# Patient Record
Sex: FEMALE | Race: WHITE | NOT HISPANIC OR LATINO | Employment: FULL TIME | URBAN - METROPOLITAN AREA
[De-identification: names, ages, dates, MRNs, and addresses within clinical notes are randomized per-mention and may not be internally consistent; named-entity substitution may affect disease eponyms.]

---

## 2017-01-09 DIAGNOSIS — E03.9 HYPOTHYROIDISM, ADULT: ICD-10-CM

## 2017-01-09 RX ORDER — LEVOTHYROXINE SODIUM 0.05 MG/1
50 TABLET ORAL DAILY
Qty: 30 TABLET | Refills: 4 | Status: SHIPPED | OUTPATIENT
Start: 2017-01-09 | End: 2017-01-10 | Stop reason: SDUPTHER

## 2017-01-10 DIAGNOSIS — E03.9 HYPOTHYROIDISM, ADULT: ICD-10-CM

## 2017-01-10 RX ORDER — LEVOTHYROXINE SODIUM 0.05 MG/1
50 TABLET ORAL DAILY
Qty: 30 TABLET | Refills: 4 | Status: SHIPPED | OUTPATIENT
Start: 2017-01-10

## 2017-01-17 DIAGNOSIS — E03.9 ADULT HYPOTHYROIDISM: Primary | ICD-10-CM

## 2017-01-26 RX ORDER — DROSPIRENONE, ETHINYL ESTRADIOL AND LEVOMEFOLATE CALCIUM AND LEVOMEFOLATE CALCIUM 3-0.02(24)
1 KIT ORAL DAILY
Qty: 84 TABLET | Refills: 3 | Status: SHIPPED | OUTPATIENT
Start: 2017-01-26 | End: 2017-11-22 | Stop reason: SDUPTHER

## 2017-03-03 ENCOUNTER — OFFICE VISIT (OUTPATIENT)
Dept: ENDOCRINOLOGY | Age: 22
End: 2017-03-03

## 2017-03-03 VITALS
SYSTOLIC BLOOD PRESSURE: 118 MMHG | DIASTOLIC BLOOD PRESSURE: 72 MMHG | BODY MASS INDEX: 27.11 KG/M2 | WEIGHT: 153 LBS | HEART RATE: 65 BPM | HEIGHT: 63 IN

## 2017-03-03 DIAGNOSIS — E03.9 HYPOTHYROIDISM, ADULT: Primary | ICD-10-CM

## 2017-03-03 PROCEDURE — 99244 OFF/OP CNSLTJ NEW/EST MOD 40: CPT | Performed by: INTERNAL MEDICINE

## 2017-03-03 NOTE — PROGRESS NOTES
CC : Thyroid disease    Toya Diana 21 y.o. WF presents as a new patient for the evaluation of Hypothyroidism. Referred by Miss. Gibbons.   Pt was diagnosed with Hypothyroidism 3 months ago, due to symptoms of Hypothyroidism.   Per pt she had abnormal TFT's 3 months ago and was started on 25 mcg oral daily and when no improvement in symptoms and ? TFTs dose was increased to levothyroxine 50 mcg oral daily.   Takes medication daily on empty stomach.     She complains of feeling tired all the time, sleep is good, gaining weight in the last 1 year about 15 pounds, c/o constipation, no c/o diarrhea, no hair loss, no dry skin, no increased sweating. No c/o heat intolerance and c/o cold intolerance. No c/o tremors, no racing of heart and no eye symptoms.   Denied c/o difficulty breathing, swallowing and change in voice.   Family hx of thyroid disease.     Menstrual hx - Menarche at age 10, periods are regular, flow is about 3 -4 days, Currently on Birth control pills as periods were heavy. LMP - 2 months as she was switching from one birth control to another. She reports that she cannot pregnant. .     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     I have reviewed the patient's allergies, medicines, past medical hx, family hx and social hx in detail.    No past medical history on file.    Family History   Problem Relation Age of Onset   • Thyroid disease Mother    • Hypertension Father        Social History     Social History   • Marital status: Single     Spouse name: N/A   • Number of children: N/A   • Years of education: N/A     Occupational History   • Not on file.     Social History Main Topics   • Smoking status: Never Smoker   • Smokeless tobacco: Not on file   • Alcohol use Not on file   • Drug use: Not on file   • Sexual activity: Not on file     Other Topics Concern   • Not on file     Social History Narrative       Allergies   Allergen Reactions   • Sulfa Antibiotics Swelling     Pt  "states her feet swell  Pt states her feet swell         Current Outpatient Prescriptions:   •  Drospiren-Eth Estrad-Levomefol 3-0.02-0.451 MG tablet, Take 1 tablet by mouth Daily., Disp: 84 tablet, Rfl: 3  •  levothyroxine (SYNTHROID, LEVOTHROID) 50 MCG tablet, Take 1 tablet by mouth Daily., Disp: 30 tablet, Rfl: 4     Review of Systems   Constitutional: Positive for chills and fatigue. Negative for appetite change and diaphoresis.   HENT: Negative for hearing loss, nosebleeds, postnasal drip, trouble swallowing and voice change.    Eyes: Negative for photophobia, discharge and visual disturbance.   Respiratory: Negative for cough, shortness of breath and wheezing.    Cardiovascular: Positive for palpitations. Negative for chest pain and leg swelling.   Gastrointestinal: Negative for abdominal distention, abdominal pain, constipation, diarrhea, nausea and vomiting.   Endocrine: Positive for cold intolerance. Negative for heat intolerance, polydipsia and polyuria.   Genitourinary: Negative for dysuria, frequency and hematuria.   Musculoskeletal: Negative for arthralgias, back pain and myalgias.   Skin: Negative for pallor, rash and wound.   Neurological: Positive for headaches. Negative for dizziness, tremors, seizures, syncope, weakness and numbness.   Hematological: Negative for adenopathy.   Psychiatric/Behavioral: Negative for agitation, confusion and sleep disturbance. The patient is not nervous/anxious.        Objective:    Visit Vitals   • /72   • Pulse 65   • Ht 63\" (160 cm)   • Wt 153 lb (69.4 kg)   • BMI 27.1 kg/m2       Physical Exam   Constitutional: She is oriented to person, place, and time. She appears well-developed and well-nourished. No distress.   HENT:   Head: Normocephalic and atraumatic.   Mouth/Throat: Oropharynx is clear and moist.   Eyes: Conjunctivae and EOM are normal. Pupils are equal, round, and reactive to light. No scleral icterus.   Neck: Normal range of motion. Neck supple. No " tracheal deviation present. No thyromegaly present.   Cardiovascular: Normal rate, regular rhythm and normal heart sounds.  Exam reveals no friction rub.    No murmur heard.  Pulmonary/Chest: Effort normal and breath sounds normal. No stridor. She has no wheezes. She has no rales.   Abdominal: Soft. Bowel sounds are normal. She exhibits no distension. There is no tenderness.   Musculoskeletal: Normal range of motion. She exhibits no edema.   Lymphadenopathy:     She has no cervical adenopathy.   Neurological: She is alert and oriented to person, place, and time. She has normal reflexes. She displays normal reflexes.   Skin: Skin is warm and dry. She is not diaphoretic.   Psychiatric: She has a normal mood and affect. Judgment normal.   Vitals reviewed.      Results Review:    I reviewed the patient's new clinical results.    Orders Only on 11/15/2016   Component Date Value Ref Range Status   • TSH 11/15/2016 1.580  0.270 - 4.200 mIU/mL Final   • T4, Total 11/15/2016 11.41  4.50 - 11.70 mcg/dL Final   • T3, Free 11/15/2016 3.0  2.0 - 4.4 pg/mL Final         Diagnoses and all orders for this visit:    Hypothyroidism, adult  -     TSH  -     T4, Free  -     T3, Free  -     Cortisol, Free  -     Hemoglobin A1c  -     TSH; Future  -     T4, Free; Future  -     T3, Free; Future    Euthyroid  Based on patient's thyroid function in July 2016 she is euthyroid and need not be placed on levothyroxin replacement.  However since the patient is on levothyroxin 50 µg oral daily and her TSH is still within the normal range I gave the patient the following 2 options  Continue the current dose until her next blood work up and see if her TSH goes to  hyperactive state and would stop the medication.  Or stop the medication as she has not noticed any improvement in her symptoms.    Patient wanted to continue the medication at this time until the repeat blood work up in 4 weeks and see if her TSH is less than 0.27 -  hyper active  thyroid.  If patient becomes iatrogenically hyperactive thyroid would definitely want to stop the medication.      Discussed with the patient that she could have various other reasons for being tired-new job, stress in life and other psychological reasons.    Will check free cortisol, HbA1c, vitamin D 25-hydroxy.    The total time spent more than 35 min for old record and lab review and face- to- face, of which greater than 50% of time was spent in counseling the patient on treatment options, instructions for management/treatment and follow up and importance of compliance with chosen management or treatment options    Monae Conte MD  03/03/17

## 2017-03-09 LAB
CORTIS F SERPL-MCNC: 0.73 UG/DL
HBA1C MFR BLD: 5.2 % (ref 4.8–5.6)
T3FREE SERPL-MCNC: 3.3 PG/ML (ref 2–4.4)
T4 FREE SERPL-MCNC: 1.34 NG/DL (ref 0.93–1.7)
TSH SERPL DL<=0.005 MIU/L-ACNC: 1.73 MIU/ML (ref 0.27–4.2)

## 2017-03-10 NOTE — PROGRESS NOTES
Mail results to pt, no treatment changes at this time. Thyroid function tests with in normal limits. Cortisol also normal.

## 2017-03-31 ENCOUNTER — RESULTS ENCOUNTER (OUTPATIENT)
Dept: ENDOCRINOLOGY | Age: 22
End: 2017-03-31

## 2017-03-31 DIAGNOSIS — E03.9 HYPOTHYROIDISM, ADULT: ICD-10-CM

## 2017-11-22 RX ORDER — DROSPIRENONE, ETHINYL ESTRADIOL AND LEVOMEFOLATE CALCIUM AND LEVOMEFOLATE CALCIUM 3-0.02(24)
KIT ORAL
Qty: 84 TABLET | Refills: 3 | Status: SHIPPED | OUTPATIENT
Start: 2017-11-22 | End: 2018-01-09 | Stop reason: SDUPTHER

## 2017-12-18 RX ORDER — DROSPIRENONE, ETHINYL ESTRADIOL AND LEVOMEFOLATE CALCIUM AND LEVOMEFOLATE CALCIUM 3-0.02(24)
KIT ORAL
Qty: 84 TABLET | Refills: 0 | OUTPATIENT
Start: 2017-12-18

## 2018-01-09 RX ORDER — DROSPIRENONE, ETHINYL ESTRADIOL AND LEVOMEFOLATE CALCIUM AND LEVOMEFOLATE CALCIUM 3-0.02(24)
KIT ORAL
Qty: 84 TABLET | Refills: 0 | Status: SHIPPED | OUTPATIENT
Start: 2018-01-09